# Patient Record
Sex: FEMALE | HISPANIC OR LATINO
[De-identification: names, ages, dates, MRNs, and addresses within clinical notes are randomized per-mention and may not be internally consistent; named-entity substitution may affect disease eponyms.]

---

## 2022-03-21 ENCOUNTER — APPOINTMENT (OUTPATIENT)
Dept: ENDOCRINOLOGY | Facility: CLINIC | Age: 72
End: 2022-03-21
Payer: MEDICARE

## 2022-03-21 VITALS
BODY MASS INDEX: 35.15 KG/M2 | HEIGHT: 62 IN | DIASTOLIC BLOOD PRESSURE: 90 MMHG | SYSTOLIC BLOOD PRESSURE: 160 MMHG | HEART RATE: 64 BPM | WEIGHT: 191 LBS

## 2022-03-21 PROCEDURE — 99205 OFFICE O/P NEW HI 60 MIN: CPT

## 2022-03-21 RX ORDER — OLMESARTAN MEDOXOMIL AND HYDROCHLOROTHIAZIDE 40; 25 MG/1; MG/1
40-25 TABLET ORAL
Qty: 90 | Refills: 0 | Status: ACTIVE | COMMUNITY
Start: 2022-02-05

## 2022-03-21 RX ORDER — ROSUVASTATIN CALCIUM 40 MG/1
40 TABLET, FILM COATED ORAL
Qty: 90 | Refills: 0 | Status: ACTIVE | COMMUNITY
Start: 2022-02-03

## 2022-03-21 RX ORDER — FENOFIBRATE 145 MG/1
145 TABLET, COATED ORAL
Qty: 90 | Refills: 0 | Status: ACTIVE | COMMUNITY
Start: 2022-02-09

## 2022-03-21 RX ORDER — OLMESARTAN MEDOXOMIL 40 MG/1
40 TABLET, FILM COATED ORAL
Qty: 90 | Refills: 0 | Status: ACTIVE | COMMUNITY
Start: 2021-09-30

## 2022-03-21 RX ORDER — PEN NEEDLE, DIABETIC 31 GX3/16"
31G X 5 MM NEEDLE, DISPOSABLE MISCELLANEOUS
Qty: 200 | Refills: 0 | Status: ACTIVE | COMMUNITY
Start: 2022-03-04

## 2022-03-21 RX ORDER — CILOSTAZOL 50 MG/1
50 TABLET ORAL
Qty: 180 | Refills: 0 | Status: ACTIVE | COMMUNITY
Start: 2022-02-21

## 2022-03-21 RX ORDER — DEXLANSOPRAZOLE 60 MG/1
60 CAPSULE, DELAYED RELEASE ORAL
Qty: 30 | Refills: 0 | Status: ACTIVE | COMMUNITY
Start: 2021-11-11

## 2022-03-21 RX ORDER — EZETIMIBE 10 MG/1
10 TABLET ORAL
Qty: 90 | Refills: 0 | Status: ACTIVE | COMMUNITY
Start: 2021-09-30

## 2022-03-21 RX ORDER — MONTELUKAST 10 MG/1
10 TABLET, FILM COATED ORAL
Qty: 90 | Refills: 0 | Status: ACTIVE | COMMUNITY
Start: 2021-10-11

## 2022-03-21 RX ORDER — PANTOPRAZOLE 40 MG/1
40 TABLET, DELAYED RELEASE ORAL
Qty: 90 | Refills: 0 | Status: ACTIVE | COMMUNITY
Start: 2021-12-03

## 2022-03-21 RX ORDER — ALBUTEROL SULFATE 90 UG/1
108 (90 BASE) INHALANT RESPIRATORY (INHALATION)
Qty: 54 | Refills: 0 | Status: ACTIVE | COMMUNITY
Start: 2021-10-19

## 2022-03-21 RX ORDER — BIMATOPROST 0.1 MG/ML
0.01 SOLUTION/ DROPS OPHTHALMIC
Qty: 8 | Refills: 0 | Status: ACTIVE | COMMUNITY
Start: 2022-03-04

## 2022-03-21 RX ORDER — ICOSAPENT ETHYL 1000 MG/1
1 CAPSULE ORAL
Qty: 360 | Refills: 0 | Status: ACTIVE | COMMUNITY
Start: 2022-02-05

## 2022-03-21 RX ORDER — HYDROCHLOROTHIAZIDE 25 MG/1
25 TABLET ORAL
Qty: 90 | Refills: 0 | Status: ACTIVE | COMMUNITY
Start: 2021-09-30

## 2022-03-21 RX ORDER — CYCLOSPORINE 0.5 MG/ML
0.05 EMULSION OPHTHALMIC
Qty: 180 | Refills: 0 | Status: ACTIVE | COMMUNITY
Start: 2022-03-04

## 2022-03-21 RX ORDER — CARVEDILOL 25 MG/1
25 TABLET, FILM COATED ORAL
Qty: 180 | Refills: 0 | Status: ACTIVE | COMMUNITY
Start: 2021-10-06

## 2022-03-21 RX ORDER — BLOOD SUGAR DIAGNOSTIC
STRIP MISCELLANEOUS
Qty: 200 | Refills: 0 | Status: ACTIVE | COMMUNITY
Start: 2022-03-04

## 2022-03-21 RX ORDER — FLUTICASONE PROPIONATE 50 UG/1
50 SPRAY, METERED NASAL
Qty: 16 | Refills: 0 | Status: ACTIVE | COMMUNITY
Start: 2022-03-08

## 2022-03-21 RX ORDER — ALCOHOL ANTISEPTIC PADS
33G X 5 MM PADS, MEDICATED (EA) TOPICAL
Qty: 200 | Refills: 0 | Status: ACTIVE | COMMUNITY
Start: 2021-11-27

## 2022-03-21 RX ORDER — PANCRELIPASE LIPASE, PANCRELIPASE PROTEASE, PANCRELIPASE AMYLASE 25000; 79000; 105000 [USP'U]/1; [USP'U]/1; [USP'U]/1
25000-79000 CAPSULE, DELAYED RELEASE ORAL
Qty: 90 | Refills: 0 | Status: ACTIVE | COMMUNITY
Start: 2021-10-08

## 2022-03-21 RX ORDER — PREDNISOLONE ACETATE 10 MG/ML
1 SUSPENSION/ DROPS OPHTHALMIC
Qty: 5 | Refills: 0 | Status: ACTIVE | COMMUNITY
Start: 2021-12-01

## 2022-03-21 NOTE — HISTORY OF PRESENT ILLNESS
[FreeTextEntry1] : 71 y/o F w /Hx of DM2, HTN, HLD, obesity, COPD, ALBINO\par here for initial evaluation and management of metabolic issues\par generally feels well and endorses no acute complaints.\par reports diagnosis 32 y/a, reports past hosp for hyperglycemia. unknown micro/macro vascular complications. reports long standing insulin use. compliant w/ rtesiba 75 units (50-25 units BID), pioglitazone 45 mg PO QD, invokamet  mg BID and ozempic 0.5 mg qweekly. She otherwise denies any f/c, CP, SOB, palpitations, tremors, depressed mood, anxiety, palpitations, n/v, stool/urinary abn, skin/weight changes, heat/cold intolerance, HAs, breast/nipple changes, polyuria/polydipsia/nocturia or other complaints.\par \par

## 2022-03-21 NOTE — ASSESSMENT
[Long Term Vascular Complications] : long term vascular complications of diabetes [Carbohydrate Consistent Diet] : carbohydrate consistent diet [Importance of Diet and Exercise] : importance of diet and exercise to improve glycemic control, achieve weight loss and improve cardiovascular health [Hypoglycemia Management] : hypoglycemia management [Self Monitoring of Blood Glucose] : self monitoring of blood glucose [Injection Technique, Storage, Sharps Disposal] : injection technique, storage, and sharps disposal [FreeTextEntry1] : 1) DM2: Uncontrolled, A1C . target of <7%. Natural hx of the disease and importance of treatment targets discussed at length, she verbalized understanding. ADA diet and importance of exercise discussed at length. Plan is to increase metformin to full dose and increase GLP-1 agonist today (ozempic 1 mg). Reduce Treiba to 60units QHS, reduce pioglitazone to 30 mg PO QD titration instructions provided. Refer to Nutritionist today. We melisa check microalbumin, lipids and labs on the NV. Discuss vaccines and podiatry/opthalmology referrals on NV (eye exam referral provided today.\par \par 2) Weight gain:  complicated by DM2. Discussed medical  strategies. Pt would like to try lifestyle modifications and GLP-1 agonist therapy at this time. Reassess on the NV for at least ~5% TBW loss.\par \par 3) Essential HTN: Pt is at goal BP and on an ACE inh. Reassess microalbumin prior to the NV.\par  \par 4) Dyslipidemia: Pt is on a moderate intensity statin. REassess lipids on the next visit. LDL target <100.\par

## 2022-03-30 DIAGNOSIS — E11.65 TYPE 2 DIABETES MELLITUS WITH UNSPECIFIED COMPLICATIONS: ICD-10-CM

## 2022-03-30 DIAGNOSIS — E11.8 TYPE 2 DIABETES MELLITUS WITH UNSPECIFIED COMPLICATIONS: ICD-10-CM

## 2022-03-30 RX ORDER — CANAGLIFLOZIN AND METFORMIN HYDROCHLORIDE 50; 1000 MG/1; MG/1
50-1000 TABLET, FILM COATED ORAL TWICE DAILY
Qty: 180 | Refills: 0 | Status: COMPLETED | COMMUNITY
Start: 2021-11-12 | End: 2022-03-30

## 2022-10-31 ENCOUNTER — APPOINTMENT (OUTPATIENT)
Dept: ENDOCRINOLOGY | Facility: CLINIC | Age: 72
End: 2022-10-31

## 2022-10-31 VITALS
DIASTOLIC BLOOD PRESSURE: 60 MMHG | HEIGHT: 62 IN | SYSTOLIC BLOOD PRESSURE: 150 MMHG | HEART RATE: 68 BPM | BODY MASS INDEX: 35.15 KG/M2 | WEIGHT: 191 LBS

## 2022-10-31 PROCEDURE — 99215 OFFICE O/P EST HI 40 MIN: CPT

## 2022-10-31 NOTE — HISTORY OF PRESENT ILLNESS
[FreeTextEntry1] : 73 y/o F w /Hx of DM2, HTN, HLD, obesity, COPD, ALBINO\par Initial evaluation and management of metabolic issues\par generally feels well and endorses no acute complaints.\par reports diagnosis 30 y/a, reports past hosp for hyperglycemia. unknown micro/macro vascular complications. reports long standing insulin use. compliant w/ rtesiba 75 units (50-25 units BID), pioglitazone 45 mg PO QD, invokamet  mg BID and ozempic 0.5 mg qweekly.\par \par 10/2022 Here for /fu, generally feels well and endorses no acute complaints. No interval events since LV. Today reports compliance w/ regimen as instructed. FSG in AM ~ 100-120, no hyupoglycemia. no LUTS/UTI\par  She otherwise denies any f/c, CP, SOB, palpitations, tremors, depressed mood, anxiety, palpitations, n/v, stool/urinary abn, skin/weight changes, heat/cold intolerance, HAs, breast/nipple changes, polyuria/polydipsia/nocturia or other complaints.\par \par

## 2022-10-31 NOTE — ASSESSMENT
[Long Term Vascular Complications] : long term vascular complications of diabetes [Carbohydrate Consistent Diet] : carbohydrate consistent diet [Importance of Diet and Exercise] : importance of diet and exercise to improve glycemic control, achieve weight loss and improve cardiovascular health [Hypoglycemia Management] : hypoglycemia management [Self Monitoring of Blood Glucose] : self monitoring of blood glucose [Injection Technique, Storage, Sharps Disposal] : injection technique, storage, and sharps disposal [FreeTextEntry1] : 1) DM2: improving control A1C . target of <7%. Natural hx of the disease and importance of treatment targets discussed at length, she verbalized understanding. ADA diet and importance of exercise discussed at length. Plan is to increase metformin to full dose and increase GLP-1 agonist today (ozempic 1 mg). Reduce Treiba to 60units QHS, reduce pioglitazone to 30 mg PO QD titration instructions provided. Refer to Nutritionist today. We melisa check microalbumin, lipids and labs on the NV. Discuss vaccines and podiatry/opthalmology referrals on NV (eye exam referral provided today.\par \par 2) Weight gain:  complicated by DM2. Discussed medical  strategies. Pt would like to try lifestyle modifications and GLP-1 agonist therapy at this time. Reassess on the NV for at least ~5% TBW loss.\par \par 3) Essential HTN: Pt is at goal BP and on an ACE inh. + microalb at 176, monitor in 4 months.\par  \par 4) Dyslipidemia: Pt is on a moderate intensity statin. REassess lipids on the next visit. LDL target <100. now at 71\par \par 5) outside CT of the A/P w/ bone degenerative changes, refer for OP screening.\par

## 2022-11-01 LAB — CREAT SPEC-SCNC: 176

## 2023-05-15 ENCOUNTER — APPOINTMENT (OUTPATIENT)
Dept: ENDOCRINOLOGY | Facility: CLINIC | Age: 73
End: 2023-05-15
Payer: MEDICARE

## 2023-05-15 VITALS
WEIGHT: 183 LBS | BODY MASS INDEX: 33.68 KG/M2 | SYSTOLIC BLOOD PRESSURE: 130 MMHG | DIASTOLIC BLOOD PRESSURE: 60 MMHG | HEIGHT: 62 IN

## 2023-05-15 PROCEDURE — 99215 OFFICE O/P EST HI 40 MIN: CPT

## 2023-05-15 NOTE — HISTORY OF PRESENT ILLNESS
[FreeTextEntry1] : 72 y/o F w /Hx of DM2, HTN, HLD, obesity, COPD, ALBINO\par Initial evaluation and management of metabolic issues\par generally feels well and endorses no acute complaints.\par reports diagnosis 30 y/a, reports past hosp for hyperglycemia. unknown micro/macro vascular complications. reports long standing insulin use. compliant w/ rtesiba 75 units (50-25 units BID), pioglitazone 45 mg PO QD, invokamet  mg BID and ozempic 0.5 mg qweekly.\par \par 5/2023 Here for /fu, generally feels well and endorses no acute complaints. No interval events since LV. Today reports compliance w/ regimen as instructed. FSG in AM ~ 100-120, no hyupoglycemia. no LUTS/UTI\par  She otherwise denies any f/c, CP, SOB, palpitations, tremors, depressed mood, anxiety, palpitations, n/v, stool/urinary abn, skin/weight changes, heat/cold intolerance, HAs, breast/nipple changes, polyuria/polydipsia/nocturia or other complaints.\par \par

## 2023-07-20 ENCOUNTER — RX RENEWAL (OUTPATIENT)
Age: 73
End: 2023-07-20

## 2023-11-13 ENCOUNTER — APPOINTMENT (OUTPATIENT)
Dept: ENDOCRINOLOGY | Facility: CLINIC | Age: 73
End: 2023-11-13
Payer: MEDICARE

## 2023-11-13 DIAGNOSIS — Z13.820 ENCOUNTER FOR SCREENING FOR OSTEOPOROSIS: ICD-10-CM

## 2023-11-13 PROCEDURE — 99215 OFFICE O/P EST HI 40 MIN: CPT

## 2023-11-13 RX ORDER — BLOOD-GLUCOSE METER
KIT MISCELLANEOUS
Qty: 300 | Refills: 3 | Status: ACTIVE | COMMUNITY
Start: 2023-11-13 | End: 1900-01-01

## 2023-11-13 RX ORDER — AMLODIPINE BESYLATE 10 MG/1
10 TABLET ORAL
Qty: 90 | Refills: 1 | Status: ACTIVE | COMMUNITY
Start: 2023-11-13 | End: 1900-01-01

## 2023-12-14 ENCOUNTER — RX RENEWAL (OUTPATIENT)
Age: 73
End: 2023-12-14

## 2024-01-10 ENCOUNTER — RX RENEWAL (OUTPATIENT)
Age: 74
End: 2024-01-10

## 2024-04-22 ENCOUNTER — APPOINTMENT (OUTPATIENT)
Dept: ENDOCRINOLOGY | Facility: CLINIC | Age: 74
End: 2024-04-22
Payer: MEDICARE

## 2024-04-22 DIAGNOSIS — E11.65 TYPE 2 DIABETES MELLITUS WITH HYPERGLYCEMIA: ICD-10-CM

## 2024-04-22 DIAGNOSIS — E78.5 HYPERLIPIDEMIA, UNSPECIFIED: ICD-10-CM

## 2024-04-22 DIAGNOSIS — I10 ESSENTIAL (PRIMARY) HYPERTENSION: ICD-10-CM

## 2024-04-22 DIAGNOSIS — E66.9 OBESITY, UNSPECIFIED: ICD-10-CM

## 2024-04-22 PROCEDURE — 99215 OFFICE O/P EST HI 40 MIN: CPT

## 2024-04-22 PROCEDURE — G2211 COMPLEX E/M VISIT ADD ON: CPT

## 2024-04-22 RX ORDER — PIOGLITAZONE HYDROCHLORIDE 30 MG/1
30 TABLET ORAL
Qty: 90 | Refills: 2 | Status: ACTIVE | COMMUNITY
Start: 2022-02-21 | End: 1900-01-01

## 2024-04-22 RX ORDER — INSULIN DEGLUDEC INJECTION 200 U/ML
200 INJECTION, SOLUTION SUBCUTANEOUS DAILY
Qty: 4 | Refills: 2 | Status: ACTIVE | COMMUNITY
Start: 2022-03-04 | End: 1900-01-01

## 2024-04-22 RX ORDER — SEMAGLUTIDE 1.34 MG/ML
4 INJECTION, SOLUTION SUBCUTANEOUS
Qty: 9 | Refills: 2 | Status: COMPLETED | COMMUNITY
Start: 2024-01-10 | End: 2024-04-22

## 2024-04-22 RX ORDER — SEMAGLUTIDE 2.68 MG/ML
8 INJECTION, SOLUTION SUBCUTANEOUS
Qty: 3 | Refills: 1 | Status: ACTIVE | COMMUNITY
Start: 2022-02-21 | End: 1900-01-01

## 2024-04-22 NOTE — ASSESSMENT
[Long Term Vascular Complications] : long term vascular complications of diabetes [Carbohydrate Consistent Diet] : carbohydrate consistent diet [Importance of Diet and Exercise] : importance of diet and exercise to improve glycemic control, achieve weight loss and improve cardiovascular health [Hypoglycemia Management] : hypoglycemia management [Self Monitoring of Blood Glucose] : self monitoring of blood glucose [Injection Technique, Storage, Sharps Disposal] : injection technique, storage, and sharps disposal [FreeTextEntry1] : 1) DM2: improving control A1C. target of <7%. Natural hx of the disease and importance of treatment targets discussed at length, she verbalized understanding. ADA diet and importance of exercise discussed at length. Plan is to increase synjardy to full dose and increase GLP-1 agonist today (ozempic 1 mg). Reduce Tresiba to 60units QHS, reduce pioglitazone to 30 mg PO QD titration instructions provided. Refer to Nutritionist today. We melisa check microalbumin, lipids and labs on the NV. Discuss vaccines and podiatry/opthalmology referrals on NV (eye exam referral provided today.    2) Weight gain: complicated by DM2. Discussed medical strategies. Pt would like to try lifestyle modifications and GLP-1 agonist therapy at this time. Reassess on the NV for at least ~5% TBW loss.    3) Essential HTN: Pt is at goal BP and on an ACE inh. + microalb at 176, monitor in 4 months.    4) Dyslipidemia: Pt is on a moderate intensity statin. REassess lipids on the next visit. LDL target <100. now at 71

## 2024-04-22 NOTE — HISTORY OF PRESENT ILLNESS
[FreeTextEntry1] : 75 y/o F w /Hx of DM2, HTN, HLD, obesity, COPD, ALBINO Initial evaluation and management of metabolic issues generally feels well and endorses no acute complaints. reports diagnosis 30 y/a, reports past hosp for hyperglycemia. unknown micro/macro vascular complications. reports long standing insulin use. compliant w/ rtesiba 75 units (50-25 units BID), pioglitazone 45 mg PO QD, invokamet  mg BID and ozempic 0.5 mg qweekly.  4/2024  Here for /fu, generally feels well and endorses no acute complaints. No interval events since LV. Today reports compliance w/ regimen as instructed. FSG in AM ~ 100-120, no hyupoglycemia. no LUTS/UTI, has had issues procuring ozempic 2 mg, pharmacy has been dispensing 1 mg only.  She otherwise denies any f/c, CP, SOB, palpitations, tremors, depressed mood, anxiety, palpitations, n/v, stool/urinary abn, skin/weight changes, heat/cold intolerance, HAs, breast/nipple changes, polyuria/polydipsia/nocturia or other complaints.

## 2024-06-11 ENCOUNTER — RX RENEWAL (OUTPATIENT)
Age: 74
End: 2024-06-11

## 2024-06-11 RX ORDER — EMPAGLIFLOZIN AND METFORMIN HYDROCHLORIDE 12.5; 1 MG/1; MG/1
12.5-1 TABLET ORAL
Qty: 180 | Refills: 0 | Status: ACTIVE | COMMUNITY
Start: 2022-03-30 | End: 1900-01-01

## 2024-11-05 ENCOUNTER — RX RENEWAL (OUTPATIENT)
Age: 74
End: 2024-11-05

## 2024-11-25 ENCOUNTER — APPOINTMENT (OUTPATIENT)
Dept: ENDOCRINOLOGY | Facility: CLINIC | Age: 74
End: 2024-11-25
Payer: MEDICARE

## 2024-11-25 DIAGNOSIS — Z13.820 ENCOUNTER FOR SCREENING FOR OSTEOPOROSIS: ICD-10-CM

## 2024-11-25 DIAGNOSIS — E78.5 HYPERLIPIDEMIA, UNSPECIFIED: ICD-10-CM

## 2024-11-25 DIAGNOSIS — E11.65 TYPE 2 DIABETES MELLITUS WITH HYPERGLYCEMIA: ICD-10-CM

## 2024-11-25 DIAGNOSIS — I10 ESSENTIAL (PRIMARY) HYPERTENSION: ICD-10-CM

## 2024-11-25 DIAGNOSIS — E66.9 OBESITY, UNSPECIFIED: ICD-10-CM

## 2024-11-25 PROCEDURE — G2211 COMPLEX E/M VISIT ADD ON: CPT

## 2024-11-25 PROCEDURE — 99215 OFFICE O/P EST HI 40 MIN: CPT

## 2025-01-07 ENCOUNTER — RX RENEWAL (OUTPATIENT)
Age: 75
End: 2025-01-07

## 2025-02-06 ENCOUNTER — RX RENEWAL (OUTPATIENT)
Age: 75
End: 2025-02-06

## 2025-03-05 ENCOUNTER — RX RENEWAL (OUTPATIENT)
Age: 75
End: 2025-03-05

## 2025-03-24 ENCOUNTER — APPOINTMENT (OUTPATIENT)
Dept: ENDOCRINOLOGY | Facility: CLINIC | Age: 75
End: 2025-03-24

## 2025-05-15 ENCOUNTER — RX RENEWAL (OUTPATIENT)
Age: 75
End: 2025-05-15

## 2025-07-07 ENCOUNTER — APPOINTMENT (OUTPATIENT)
Dept: ENDOCRINOLOGY | Facility: CLINIC | Age: 75
End: 2025-07-07